# Patient Record
Sex: MALE | URBAN - METROPOLITAN AREA
[De-identification: names, ages, dates, MRNs, and addresses within clinical notes are randomized per-mention and may not be internally consistent; named-entity substitution may affect disease eponyms.]

---

## 2019-09-16 ENCOUNTER — TELEPHONE (OUTPATIENT)
Dept: UROLOGY | Facility: MEDICAL CENTER | Age: 29
End: 2019-09-16

## 2019-09-16 NOTE — TELEPHONE ENCOUNTER
Spoke to pt's spouse and let her know we received the records from [facility]  Offered an appt Friday morning with Dr Charlene Stinson but she wanted a sooner new patient appt  Will continue to look at other offices

## 2019-09-16 NOTE — TELEPHONE ENCOUNTER
Post Er Fu - Please Triage    Patient was seen in [address] [place] [address] over the weekend    Sent Fax to [facility] fax number [phone] for request     Reason/DX:Retention - crook inserted    Insurance:[employer] member id [id number]    Previous Urologist:no    History of Cancer:no    Outside Imaging/Labs:[facility] labs were done and ultrasound of bladder    Preferred Location: Wife requesting appointment as soon at any possible  location

## 2024-10-11 ENCOUNTER — HOSPITAL ENCOUNTER (EMERGENCY)
Facility: HOSPITAL | Age: 34
Discharge: HOME | End: 2024-10-11
Attending: EMERGENCY MEDICINE
Payer: COMMERCIAL

## 2024-10-11 ENCOUNTER — APPOINTMENT (EMERGENCY)
Dept: RADIOLOGY | Facility: HOSPITAL | Age: 34
End: 2024-10-11
Payer: COMMERCIAL

## 2024-10-11 VITALS
WEIGHT: 202.6 LBS | RESPIRATION RATE: 18 BRPM | TEMPERATURE: 100.4 F | OXYGEN SATURATION: 96 % | SYSTOLIC BLOOD PRESSURE: 120 MMHG | HEART RATE: 108 BPM | DIASTOLIC BLOOD PRESSURE: 71 MMHG

## 2024-10-11 DIAGNOSIS — J10.1 INFLUENZA B: Primary | ICD-10-CM

## 2024-10-11 DIAGNOSIS — N39.0 URINARY TRACT INFECTION: ICD-10-CM

## 2024-10-11 LAB
ALBUMIN SERPL BCG-MCNC: 4.5 G/DL (ref 3.5–5)
ALP SERPL-CCNC: 62 U/L (ref 34–104)
ALT SERPL W P-5'-P-CCNC: 34 U/L (ref 7–52)
ANION GAP SERPL CALCULATED.3IONS-SCNC: 7 MMOL/L (ref 4–13)
AST SERPL W P-5'-P-CCNC: 25 U/L (ref 13–39)
BACTERIA UR QL AUTO: ABNORMAL /HPF
BASOPHILS # BLD AUTO: 0.03 THOUSANDS/ΜL (ref 0–0.1)
BASOPHILS NFR BLD AUTO: 0 % (ref 0–1)
BILIRUB SERPL-MCNC: 0.76 MG/DL (ref 0.2–1)
BILIRUB UR QL STRIP: NEGATIVE
BUN SERPL-MCNC: 11 MG/DL (ref 5–25)
CALCIUM SERPL-MCNC: 9.5 MG/DL (ref 8.4–10.2)
CHLORIDE SERPL-SCNC: 101 MMOL/L (ref 96–108)
CLARITY UR: CLEAR
CO2 SERPL-SCNC: 27 MMOL/L (ref 21–32)
COLOR UR: YELLOW
CREAT SERPL-MCNC: 1 MG/DL (ref 0.6–1.3)
EOSINOPHIL # BLD AUTO: 0 THOUSAND/ΜL (ref 0–0.61)
EOSINOPHIL NFR BLD AUTO: 0 % (ref 0–6)
ERYTHROCYTE [DISTWIDTH] IN BLOOD BY AUTOMATED COUNT: 13.9 % (ref 11.6–15.1)
FLUAV AG UPPER RESP QL IA.RAPID: NEGATIVE
FLUBV AG UPPER RESP QL IA.RAPID: POSITIVE
GFR SERPL CREATININE-BSD FRML MDRD: 97 ML/MIN/1.73SQ M
GLUCOSE SERPL-MCNC: 106 MG/DL (ref 65–140)
GLUCOSE UR STRIP-MCNC: NEGATIVE MG/DL
HCT VFR BLD AUTO: 41.6 % (ref 36.5–49.3)
HGB BLD-MCNC: 13.6 G/DL (ref 12–17)
HGB UR QL STRIP.AUTO: ABNORMAL
IMM GRANULOCYTES # BLD AUTO: 0.09 THOUSAND/UL (ref 0–0.2)
IMM GRANULOCYTES NFR BLD AUTO: 1 % (ref 0–2)
KETONES UR STRIP-MCNC: NEGATIVE MG/DL
LEUKOCYTE ESTERASE UR QL STRIP: ABNORMAL
LIPASE SERPL-CCNC: 29 U/L (ref 11–82)
LYMPHOCYTES # BLD AUTO: 1.2 THOUSANDS/ΜL (ref 0.6–4.47)
LYMPHOCYTES NFR BLD AUTO: 8 % (ref 14–44)
MCH RBC QN AUTO: 26.6 PG (ref 26.8–34.3)
MCHC RBC AUTO-ENTMCNC: 32.7 G/DL (ref 31.4–37.4)
MCV RBC AUTO: 81 FL (ref 82–98)
MONOCYTES # BLD AUTO: 0.91 THOUSAND/ΜL (ref 0.17–1.22)
MONOCYTES NFR BLD AUTO: 6 % (ref 4–12)
NEUTROPHILS # BLD AUTO: 13.72 THOUSANDS/ΜL (ref 1.85–7.62)
NEUTS SEG NFR BLD AUTO: 85 % (ref 43–75)
NITRITE UR QL STRIP: NEGATIVE
NON-SQ EPI CELLS URNS QL MICRO: ABNORMAL /HPF
NRBC BLD AUTO-RTO: 0 /100 WBCS
PH UR STRIP.AUTO: 7 [PH]
PLATELET # BLD AUTO: 254 THOUSANDS/UL (ref 149–390)
PMV BLD AUTO: 8.2 FL (ref 8.9–12.7)
POTASSIUM SERPL-SCNC: 3.4 MMOL/L (ref 3.5–5.3)
PROT SERPL-MCNC: 8.2 G/DL (ref 6.4–8.4)
PROT UR STRIP-MCNC: NEGATIVE MG/DL
RBC # BLD AUTO: 5.12 MILLION/UL (ref 3.88–5.62)
RBC #/AREA URNS AUTO: ABNORMAL /HPF
S PYO DNA THROAT QL NAA+PROBE: NOT DETECTED
SARS-COV+SARS-COV-2 AG RESP QL IA.RAPID: NEGATIVE
SODIUM SERPL-SCNC: 135 MMOL/L (ref 135–147)
SP GR UR STRIP.AUTO: 1.01 (ref 1–1.03)
UROBILINOGEN UR QL STRIP.AUTO: 0.2 E.U./DL
WBC # BLD AUTO: 15.95 THOUSAND/UL (ref 4.31–10.16)
WBC #/AREA URNS AUTO: ABNORMAL /HPF

## 2024-10-11 PROCEDURE — 96365 THER/PROPH/DIAG IV INF INIT: CPT

## 2024-10-11 PROCEDURE — 87811 SARS-COV-2 COVID19 W/OPTIC: CPT | Performed by: EMERGENCY MEDICINE

## 2024-10-11 PROCEDURE — 36415 COLL VENOUS BLD VENIPUNCTURE: CPT | Performed by: EMERGENCY MEDICINE

## 2024-10-11 PROCEDURE — 87086 URINE CULTURE/COLONY COUNT: CPT | Performed by: EMERGENCY MEDICINE

## 2024-10-11 PROCEDURE — 71045 X-RAY EXAM CHEST 1 VIEW: CPT

## 2024-10-11 PROCEDURE — 99284 EMERGENCY DEPT VISIT MOD MDM: CPT | Performed by: EMERGENCY MEDICINE

## 2024-10-11 PROCEDURE — 80053 COMPREHEN METABOLIC PANEL: CPT | Performed by: EMERGENCY MEDICINE

## 2024-10-11 PROCEDURE — 85025 COMPLETE CBC W/AUTO DIFF WBC: CPT | Performed by: EMERGENCY MEDICINE

## 2024-10-11 PROCEDURE — 87077 CULTURE AEROBIC IDENTIFY: CPT | Performed by: EMERGENCY MEDICINE

## 2024-10-11 PROCEDURE — 87651 STREP A DNA AMP PROBE: CPT | Performed by: EMERGENCY MEDICINE

## 2024-10-11 PROCEDURE — 87147 CULTURE TYPE IMMUNOLOGIC: CPT | Performed by: EMERGENCY MEDICINE

## 2024-10-11 PROCEDURE — 87804 INFLUENZA ASSAY W/OPTIC: CPT | Performed by: EMERGENCY MEDICINE

## 2024-10-11 PROCEDURE — 87186 SC STD MICRODIL/AGAR DIL: CPT | Performed by: EMERGENCY MEDICINE

## 2024-10-11 PROCEDURE — 83690 ASSAY OF LIPASE: CPT | Performed by: EMERGENCY MEDICINE

## 2024-10-11 PROCEDURE — 81001 URINALYSIS AUTO W/SCOPE: CPT | Performed by: EMERGENCY MEDICINE

## 2024-10-11 PROCEDURE — 99283 EMERGENCY DEPT VISIT LOW MDM: CPT

## 2024-10-11 PROCEDURE — 96361 HYDRATE IV INFUSION ADD-ON: CPT

## 2024-10-11 RX ORDER — CEFTRIAXONE 1 G/50ML
1000 INJECTION, SOLUTION INTRAVENOUS ONCE
Status: COMPLETED | OUTPATIENT
Start: 2024-10-11 | End: 2024-10-11

## 2024-10-11 RX ORDER — CEPHALEXIN 500 MG/1
500 CAPSULE ORAL EVERY 12 HOURS SCHEDULED
Qty: 14 CAPSULE | Refills: 0 | Status: SHIPPED | OUTPATIENT
Start: 2024-10-11 | End: 2024-10-18

## 2024-10-11 RX ORDER — ACETAMINOPHEN 325 MG/1
650 TABLET ORAL ONCE
Status: COMPLETED | OUTPATIENT
Start: 2024-10-11 | End: 2024-10-11

## 2024-10-11 RX ORDER — IBUPROFEN 600 MG/1
600 TABLET, FILM COATED ORAL ONCE
Status: COMPLETED | OUTPATIENT
Start: 2024-10-11 | End: 2024-10-11

## 2024-10-11 RX ADMIN — CEFTRIAXONE 1000 MG: 1 INJECTION, SOLUTION INTRAVENOUS at 15:51

## 2024-10-11 RX ADMIN — ACETAMINOPHEN 325MG 650 MG: 325 TABLET ORAL at 15:51

## 2024-10-11 RX ADMIN — SODIUM CHLORIDE 1000 ML: 0.9 INJECTION, SOLUTION INTRAVENOUS at 14:42

## 2024-10-11 RX ADMIN — IBUPROFEN 600 MG: 600 TABLET, FILM COATED ORAL at 14:49

## 2024-10-11 NOTE — DISCHARGE INSTRUCTIONS
Please schedule follow-up with your urologist.    Please take the prescribed antibiotic.    Return to the emergency room for any new or concerning symptom or for failure to improve.

## 2024-10-11 NOTE — ED PROVIDER NOTES
Time reflects when diagnosis was documented in both MDM as applicable and the Disposition within this note       Time User Action Codes Description Comment    10/11/2024  4:38 PM Everette Saucedo [J10.1] Influenza B     10/11/2024  4:38 PM Everette Saucedo [N39.0] Urinary tract infection           ED Disposition       ED Disposition   Discharge    Condition   Stable    Date/Time   Fri Oct 11, 2024  4:37 PM    Comment   Rickie Smiths discharge to home/self care.                   Assessment & Plan       Medical Decision Making  Based on the history and medical screening exam performed the patient may be at risk for flu/COVID/RSV, pneumonia, other viral respiratory flulike illness, urinary tract infection, dehydration, electrolyte abnormality.    Based on the work-up performed in the emergency room which includes physical examination, and which may include laboratory studies and imaging as warranted including advanced imaging such as CT scan or ultrasound, the diagnostic considerations are narrowed to exclude limb or life-threatening process.    The patient is stable for discharge.  Workup in the emergency department reveals influenza B.  Also urinary tract infection.  Patient is already followed by urology.  Dose of Rocephin given in ED.  Temperature improved to 100.4.  Stable for discharge and outpatient oral antibiotic therapy    Amount and/or Complexity of Data Reviewed  Labs: ordered. Decision-making details documented in ED Course.     Details: Mild leukocytosis, influenza B positive, urinary tract infection noted  Radiology: ordered and independent interpretation performed. Decision-making details documented in ED Course.     Details: Chest x-ray negative    Risk  OTC drugs.  Prescription drug management.             Medications   sodium chloride 0.9 % bolus 1,000 mL (0 mL Intravenous Stopped 10/11/24 1618)   ibuprofen (MOTRIN) tablet 600 mg (600 mg Oral Given 10/11/24 1449)   acetaminophen (TYLENOL)  tablet 650 mg (650 mg Oral Given 10/11/24 1551)   cefTRIAXone (ROCEPHIN) IVPB (premix in dextrose) 1,000 mg 50 mL (0 mg Intravenous Stopped 10/11/24 1621)       ED Risk Strat Scores                           SBIRT 22yo+      Flowsheet Row Most Recent Value   Initial Alcohol Screen: US AUDIT-C     1. How often do you have a drink containing alcohol? 0 Filed at: 10/11/2024 1425   2. How many drinks containing alcohol do you have on a typical day you are drinking?  0 Filed at: 10/11/2024 1425   3a. Male UNDER 65: How often do you have five or more drinks on one occasion? 0 Filed at: 10/11/2024 1425   3b. FEMALE Any Age, or MALE 65+: How often do you have 4 or more drinks on one occassion? 0 Filed at: 10/11/2024 1425   Audit-C Score 0 Filed at: 10/11/2024 1425   CA: How many times in the past year have you...    Used an illegal drug or used a prescription medication for non-medical reasons? Never Filed at: 10/11/2024 1425                            History of Present Illness       Chief Complaint   Patient presents with    Fever     PT reports fever and flu like symptoms. Pt has hx of prostate cancer. Pt is currently undergoing immunotherapy. Has been for aprx 2 years.        Past Medical History:   Diagnosis Date    Prostate cancer (HCC)       Past Surgical History:   Procedure Laterality Date    URINARY DIVERSION        History reviewed. No pertinent family history.   Social History     Tobacco Use    Smoking status: Never    Smokeless tobacco: Never   Vaping Use    Vaping status: Never Used   Substance Use Topics    Drug use: Never      E-Cigarette/Vaping    E-Cigarette Use Never User       E-Cigarette/Vaping Substances      I have reviewed and agree with the history as documented.       History provided by:  Patient   used: No    Flu Symptoms  Presenting symptoms: cough, diarrhea, fatigue, fever, headache, myalgias and rhinorrhea    Presenting symptoms: no nausea, no shortness of breath, no  sore throat and no vomiting    Severity:  Moderate  Onset quality:  Gradual  Duration:  2 days  Progression:  Unchanged  Chronicity:  New  Relieved by:  Nothing  Worsened by:  Nothing  Ineffective treatments:  None tried  Associated symptoms: chills, decreased appetite and nasal congestion    Associated symptoms: no ear pain, no neck stiffness and no syncope        Review of Systems   Constitutional:  Positive for chills, decreased appetite, fatigue and fever.   HENT:  Positive for congestion and rhinorrhea. Negative for ear pain, hearing loss, sore throat, trouble swallowing and voice change.    Eyes:  Negative for pain and discharge.   Respiratory:  Positive for cough. Negative for shortness of breath and wheezing.    Cardiovascular:  Negative for chest pain and palpitations.   Gastrointestinal:  Positive for diarrhea. Negative for abdominal pain, blood in stool, constipation, nausea and vomiting.   Genitourinary:  Negative for dysuria, flank pain, frequency and hematuria.   Musculoskeletal:  Positive for myalgias. Negative for joint swelling, neck pain and neck stiffness.   Skin:  Negative for rash and wound.   Neurological:  Positive for headaches. Negative for dizziness, seizures, syncope and facial asymmetry.   Psychiatric/Behavioral:  Negative for hallucinations, self-injury and suicidal ideas.    All other systems reviewed and are negative.          Objective       ED Triage Vitals   Temperature Pulse Blood Pressure Respirations SpO2 Patient Position - Orthostatic VS   10/11/24 1417 10/11/24 1417 10/11/24 1417 10/11/24 1417 10/11/24 1417 10/11/24 1417   (!) 101.5 °F (38.6 °C) (!) 113 135/78 18 99 % Lying      Temp Source Heart Rate Source BP Location FiO2 (%) Pain Score    10/11/24 1417 10/11/24 1420 10/11/24 1417 -- 10/11/24 1449    Oral Monitor Left arm  3      Vitals      Date and Time Temp Pulse SpO2 Resp BP Pain Score FACES Pain Rating User   10/11/24 1630 100.4 °F (38 °C) 108 96 % 18 120/71 -- -- CG    10/11/24 1600 -- 106 98 % 20 118/78 -- --    10/11/24 1551 -- -- -- -- -- Med Not Given for Pain - for MAR use only --    10/11/24 1530 103.4 °F (39.7 °C) 108 98 % 16 122/76 -- -- CG   10/11/24 1515 -- 103 99 % 16 126/76 -- -- CG   10/11/24 1500 -- -- -- 16 -- -- --    10/11/24 1500 -- 103 99 % -- 135/80 -- -- MB   10/11/24 1449 -- -- -- -- -- 3 --    10/11/24 1420 -- 108 97 % 18 135/78 -- --    10/11/24 1417 101.5 °F (38.6 °C) 113 99 % 18 135/78 -- -- MY            Physical Exam  Vitals and nursing note reviewed.   Constitutional:       General: He is not in acute distress.     Appearance: He is well-developed.   HENT:      Head: Normocephalic and atraumatic.      Right Ear: Tympanic membrane, ear canal and external ear normal.      Left Ear: Tympanic membrane, ear canal and external ear normal.      Nose: Congestion present.      Mouth/Throat:      Mouth: Mucous membranes are moist.      Pharynx: Oropharynx is clear. No oropharyngeal exudate or posterior oropharyngeal erythema.   Eyes:      General: No scleral icterus.        Right eye: No discharge.         Left eye: No discharge.      Extraocular Movements: Extraocular movements intact.      Conjunctiva/sclera: Conjunctivae normal.   Cardiovascular:      Rate and Rhythm: Normal rate and regular rhythm.      Heart sounds: Normal heart sounds. No murmur heard.  Pulmonary:      Effort: Pulmonary effort is normal.      Breath sounds: Normal breath sounds. No wheezing or rales.   Abdominal:      General: Bowel sounds are normal. There is no distension.      Palpations: Abdomen is soft.      Tenderness: There is no abdominal tenderness. There is no guarding or rebound.   Musculoskeletal:         General: No deformity. Normal range of motion.      Cervical back: Normal range of motion and neck supple. No rigidity.   Skin:     General: Skin is warm and dry.      Findings: No rash.   Neurological:      General: No focal deficit present.      Mental Status: He  is alert and oriented to person, place, and time.      Cranial Nerves: No cranial nerve deficit.   Psychiatric:         Mood and Affect: Mood normal.         Behavior: Behavior normal.         Thought Content: Thought content normal.         Judgment: Judgment normal.         Results Reviewed       Procedure Component Value Units Date/Time    Strep A PCR [[id number]]  (Normal) Collected: 10/11/24 1439    Lab Status: Final result Specimen: Throat Updated: 10/11/24 1511     STREP A PCR Not Detected    Comprehensive metabolic panel [[id number]]  (Abnormal) Collected: 10/11/24 1439    Lab Status: Final result Specimen: Blood from Arm, Right Updated: 10/11/24 1508     Sodium 135 mmol/L      Potassium 3.4 mmol/L      Chloride 101 mmol/L      CO2 27 mmol/L      ANION GAP 7 mmol/L      BUN 11 mg/dL      Creatinine 1.00 mg/dL      Glucose 106 mg/dL      Calcium 9.5 mg/dL      AST 25 U/L      ALT 34 U/L      Alkaline Phosphatase 62 U/L      Total Protein 8.2 g/dL      Albumin 4.5 g/dL      Total Bilirubin 0.76 mg/dL      eGFR 97 ml/min/1.73sq m     Narrative:      National Kidney Disease Foundation guidelines for Chronic Kidney Disease (CKD):     Stage 1 with normal or high GFR (GFR > 90 mL/min/1.73 square meters)    Stage 2 Mild CKD (GFR = 60-89 mL/min/1.73 square meters)    Stage 3A Moderate CKD (GFR = 45-59 mL/min/1.73 square meters)    Stage 3B Moderate CKD (GFR = 30-44 mL/min/1.73 square meters)    Stage 4 Severe CKD (GFR = 15-29 mL/min/1.73 square meters)    Stage 5 End Stage CKD (GFR <15 mL/min/1.73 square meters)  Note: GFR calculation is accurate only with a steady state creatinine    Lipase [[id number]]  (Normal) Collected: 10/11/24 1439    Lab Status: Final result Specimen: Blood from Arm, Right Updated: 10/11/24 1508     Lipase 29 u/L     Urine Microscopic [[id number]]  (Abnormal) Collected: 10/11/24 1447    Lab Status: Final result Specimen: Urine, Other Updated: 10/11/24 1505     RBC, UA 1-2 /hpf      WBC, UA 10-20  /hpf      Epithelial Cells Occasional /hpf      Bacteria, UA Moderate /hpf     Urine culture [[id number]] Collected: 10/11/24 1447    Lab Status: In process Specimen: Urine, Other Updated: 10/11/24 1505    COVID-19/ Infleunza A/B Rapid Anitgen(30 min. TAT) [[id number]]  (Abnormal) Collected: 10/11/24 1439    Lab Status: Final result Specimen: Nares from Nose Updated: 10/11/24 1503     SARS COV Rapid Antigen Negative     Influenza A Rapid Antigen Negative     Influenza B Rapid Antigen Positive    Narrative:      This test has been performed using the [employer] Cindy 2 FLU+SARS Antigen test under the Emergency Use Authorization (EUA). This test has been validated by the  and verified by the performing laboratory. The Cindy uses lateral flow immunofluorescent sandwich assay to detect SARS-COV, Influenza A and Influenza B Antigen.     The [employer] Cindy 2 SARS Antigen test does not differentiate between SARS-CoV and SARS-CoV-2.     Negative results are presumptive and may be confirmed with a molecular assay, if necessary, for patient management. Negative results do not rule out SARS-CoV-2 or influenza infection and should not be used as the sole basis for treatment or patient management decisions. A negative test result may occur if the level of antigen in a sample is below the limit of detection of this test.     Positive results are indicative of the presence of viral antigens, but do not rule out bacterial infection or co-infection with other viruses.     All test results should be used as an adjunct to clinical observations and other information available to the provider.    FOR PEDIATRIC PATIENTS - copy/paste COVID Guidelines URL to browser: [email or web address]    UA w Reflex to Microscopic w Reflex to Culture [[id number]]  (Abnormal) Collected: 10/11/24 1447    Lab Status: Final result Specimen: Urine, Other Updated: 10/11/24 1453     Color, UA Yellow      Clarity, UA Clear     Specific Gravity, UA 1.010     pH, UA 7.0     Leukocytes, UA Small     Nitrite, UA Negative     Protein, UA Negative mg/dl      Glucose, UA Negative mg/dl      Ketones, UA Negative mg/dl      Urobilinogen, UA 0.2 E.U./dl      Bilirubin, UA Negative     Occult Blood, UA Trace-Intact    CBC and differential [[id number]]  (Abnormal) Collected: 10/11/24 1439    Lab Status: Final result Specimen: Blood from Arm, Right Updated: 10/11/24 1445     WBC 15.95 Thousand/uL      RBC 5.12 Million/uL      Hemoglobin 13.6 g/dL      Hematocrit 41.6 %      MCV 81 fL      MCH 26.6 pg      MCHC 32.7 g/dL      RDW 13.9 %      MPV 8.2 fL      Platelets 254 Thousands/uL      nRBC 0 /100 WBCs      Segmented % 85 %      Immature Grans % 1 %      Lymphocytes % 8 %      Monocytes % 6 %      Eosinophils Relative 0 %      Basophils Relative 0 %      Absolute Neutrophils 13.72 Thousands/µL      Absolute Immature Grans 0.09 Thousand/uL      Absolute Lymphocytes 1.20 Thousands/µL      Absolute Monocytes 0.91 Thousand/µL      Eosinophils Absolute 0.00 Thousand/µL      Basophils Absolute 0.03 Thousands/µL             XR chest portable   Final Interpretation by Eddie Hardy MD (10/11 1528)      No acute cardiopulmonary disease.            Workstation performed: [id number]             Procedures    ED Medication and Procedure Management   None     Patient's Medications   Discharge Prescriptions    CEPHALEXIN (KEFLEX) 500 MG CAPSULE    Take 1 capsule (500 mg total) by mouth every 12 (twelve) hours for 7 days       Start Date: 10/11/2024End Date: 10/18/2024       Order Dose: 500 mg       Quantity: 14 capsule    Refills: 0     No discharge procedures on file.  ED SEPSIS DOCUMENTATION   Time reflects when diagnosis was documented in both MDM as applicable and the Disposition within this note       Time User Action Codes Description Comment    10/11/2024  4:38 PM Everette Saucedo Add [J10.1] Influenza B     10/11/2024  4:38 PM  Everette Saucedo Add [N39.0] Urinary tract infection                  Everette Saucedo MD  10/11/24 1638

## 2024-10-11 NOTE — ED NOTES
Provider aware patient meets sepsis criteria. Provider does not want sepsis alert at this time.      Gabriella Figueroa RN  10/11/24 5587

## 2024-10-13 LAB — BACTERIA UR CULT: NORMAL

## 2024-10-14 LAB
BACTERIA UR CULT: ABNORMAL
BACTERIA UR CULT: ABNORMAL

## 2024-10-15 RX ORDER — DOXYCYCLINE 100 MG/1
100 CAPSULE ORAL EVERY 12 HOURS
Qty: 14 CAPSULE | Refills: 0 | Status: SHIPPED | OUTPATIENT
Start: 2024-10-15 | End: 2024-10-22